# Patient Record
Sex: FEMALE | Race: WHITE
[De-identification: names, ages, dates, MRNs, and addresses within clinical notes are randomized per-mention and may not be internally consistent; named-entity substitution may affect disease eponyms.]

---

## 2021-01-07 ENCOUNTER — HOSPITAL ENCOUNTER (EMERGENCY)
Dept: HOSPITAL 47 - EC | Age: 61
Discharge: HOME | End: 2021-01-07
Payer: COMMERCIAL

## 2021-01-07 VITALS — HEART RATE: 74 BPM | DIASTOLIC BLOOD PRESSURE: 55 MMHG | SYSTOLIC BLOOD PRESSURE: 103 MMHG

## 2021-01-07 VITALS — TEMPERATURE: 97.9 F | RESPIRATION RATE: 18 BRPM

## 2021-01-07 DIAGNOSIS — F41.9: ICD-10-CM

## 2021-01-07 DIAGNOSIS — F32.9: ICD-10-CM

## 2021-01-07 DIAGNOSIS — Z88.1: ICD-10-CM

## 2021-01-07 DIAGNOSIS — Z99.89: ICD-10-CM

## 2021-01-07 DIAGNOSIS — Z88.5: ICD-10-CM

## 2021-01-07 DIAGNOSIS — E11.9: ICD-10-CM

## 2021-01-07 DIAGNOSIS — G47.30: ICD-10-CM

## 2021-01-07 DIAGNOSIS — Z79.899: ICD-10-CM

## 2021-01-07 DIAGNOSIS — E78.5: ICD-10-CM

## 2021-01-07 DIAGNOSIS — R42: ICD-10-CM

## 2021-01-07 DIAGNOSIS — I10: ICD-10-CM

## 2021-01-07 DIAGNOSIS — Z79.890: ICD-10-CM

## 2021-01-07 DIAGNOSIS — J45.909: ICD-10-CM

## 2021-01-07 DIAGNOSIS — R55: Primary | ICD-10-CM

## 2021-01-07 DIAGNOSIS — Z79.84: ICD-10-CM

## 2021-01-07 LAB
ALBUMIN SERPL-MCNC: 4.3 G/DL (ref 3.5–5)
ALP SERPL-CCNC: 118 U/L (ref 38–126)
ALT SERPL-CCNC: 19 U/L (ref 4–34)
ANION GAP SERPL CALC-SCNC: 7 MMOL/L
APTT BLD: 23.2 SEC (ref 22–30)
AST SERPL-CCNC: 22 U/L (ref 14–36)
BASOPHILS # BLD AUTO: 0.1 K/UL (ref 0–0.2)
BASOPHILS NFR BLD AUTO: 0 %
BUN SERPL-SCNC: 16 MG/DL (ref 7–17)
CALCIUM SPEC-MCNC: 9 MG/DL (ref 8.4–10.2)
CHLORIDE SERPL-SCNC: 106 MMOL/L (ref 98–107)
CO2 SERPL-SCNC: 26 MMOL/L (ref 22–30)
EOSINOPHIL # BLD AUTO: 0.4 K/UL (ref 0–0.7)
EOSINOPHIL NFR BLD AUTO: 4 %
ERYTHROCYTE [DISTWIDTH] IN BLOOD BY AUTOMATED COUNT: 4.92 M/UL (ref 3.8–5.4)
ERYTHROCYTE [DISTWIDTH] IN BLOOD: 15.5 % (ref 11.5–15.5)
GLUCOSE SERPL-MCNC: 154 MG/DL (ref 74–99)
HCT VFR BLD AUTO: 40.1 % (ref 34–46)
HGB BLD-MCNC: 12.7 GM/DL (ref 11.4–16)
INR PPP: 0.9 (ref ?–1.2)
LYMPHOCYTES # SPEC AUTO: 2.5 K/UL (ref 1–4.8)
LYMPHOCYTES NFR SPEC AUTO: 22 %
MAGNESIUM SPEC-SCNC: 2.3 MG/DL (ref 1.6–2.3)
MCH RBC QN AUTO: 25.9 PG (ref 25–35)
MCHC RBC AUTO-ENTMCNC: 31.7 G/DL (ref 31–37)
MCV RBC AUTO: 81.5 FL (ref 80–100)
MONOCYTES # BLD AUTO: 0.5 K/UL (ref 0–1)
MONOCYTES NFR BLD AUTO: 4 %
NEUTROPHILS # BLD AUTO: 7.6 K/UL (ref 1.3–7.7)
NEUTROPHILS NFR BLD AUTO: 67 %
PH UR: 6.5 [PH] (ref 5–8)
PLATELET # BLD AUTO: 266 K/UL (ref 150–450)
POTASSIUM SERPL-SCNC: 4.2 MMOL/L (ref 3.5–5.1)
PROT SERPL-MCNC: 7.2 G/DL (ref 6.3–8.2)
PT BLD: 9.6 SEC (ref 9–12)
SODIUM SERPL-SCNC: 139 MMOL/L (ref 137–145)
SP GR UR: 1.02 (ref 1–1.03)
UROBILINOGEN UR QL STRIP: <2 MG/DL (ref ?–2)
WBC # BLD AUTO: 11.3 K/UL (ref 3.8–10.6)

## 2021-01-07 PROCEDURE — 85730 THROMBOPLASTIN TIME PARTIAL: CPT

## 2021-01-07 PROCEDURE — 70496 CT ANGIOGRAPHY HEAD: CPT

## 2021-01-07 PROCEDURE — 93005 ELECTROCARDIOGRAM TRACING: CPT

## 2021-01-07 PROCEDURE — 99285 EMERGENCY DEPT VISIT HI MDM: CPT

## 2021-01-07 PROCEDURE — 83735 ASSAY OF MAGNESIUM: CPT

## 2021-01-07 PROCEDURE — 80053 COMPREHEN METABOLIC PANEL: CPT

## 2021-01-07 PROCEDURE — 81003 URINALYSIS AUTO W/O SCOPE: CPT

## 2021-01-07 PROCEDURE — 70498 CT ANGIOGRAPHY NECK: CPT

## 2021-01-07 PROCEDURE — 84484 ASSAY OF TROPONIN QUANT: CPT

## 2021-01-07 PROCEDURE — 36415 COLL VENOUS BLD VENIPUNCTURE: CPT

## 2021-01-07 PROCEDURE — 85610 PROTHROMBIN TIME: CPT

## 2021-01-07 PROCEDURE — 85025 COMPLETE CBC W/AUTO DIFF WBC: CPT

## 2021-01-07 PROCEDURE — 70450 CT HEAD/BRAIN W/O DYE: CPT

## 2021-01-07 PROCEDURE — 83605 ASSAY OF LACTIC ACID: CPT

## 2021-01-07 PROCEDURE — 71046 X-RAY EXAM CHEST 2 VIEWS: CPT

## 2021-01-07 NOTE — CT
EXAMINATION TYPE: CT angio head neck

 

DATE OF EXAM: 1/7/2021

 

COMPARISON: CT brain same day

 

HISTORY: 60-year-old female headache and dizziness. Family history of aneurysms

 

TECHNIQUE: Contiguous axial scanning of the head and neck performed with IV Contrast, patient injecte
d with 65 mL of Isovue 370. Coronal/sagittal MIP reconstructions performed. 3-D reconstructions gener
ated on a dedicated independent workstation.

 

CT DLP: 1711.2 mGycm

Automated exposure control for dose reduction was used.

 

FINDINGS: 

NECK:

Bovine configuration to the aortic arch.

 

Both vertebral artery origins are patent. The vessels are codominant and patent throughout their cour
se.

 

The right brachiocephalic and right common carotid arteries are patent.

Minimal eccentric atherosclerotic change within the right carotid bulb.

The right internal carotid artery is patent.

 

The left common and left internal carotid arteries are patent with minimal eccentric atherosclerotic 
calcification in the left carotid bulb.

 

HEAD:

The V4 segments of the bilateral vertebral arteries become somewhat small in caliber. The basilar art
bhvaesh is also small in caliber. Persistent fetal origin of the posterior cerebral arteries with hypopla
stic P1 segments of the posterior cerebral arteries.

 

Posterior circulation is otherwise patent.

 

The internal carotid arteries are patent as are the remainder of the anterior circulation. The left A
CA beyond the anterior communicating artery level is slightly hypoplastic.

 

No aneurysmal change is identified.

 

 

IMPRESSION: 

 

1. NECK: WIDELY PATENT CAROTID AND VERTEBRAL ARTERIES OF THE NECK.

 

2. HEAD: DIMINUTIVE INTRACRANIAL VERTEBROBASILAR SYSTEM. CORRELATE FOR ANY CHRONIC SYMPTOMS OF VERTEB
ROBASILAR INSUFFICIENCY. THERE ARE PERSISTENT FETAL ORIGINS TO THE BILATERAL PCA's. NO LARGE VESSEL I
NTRACRANIAL ARTERIAL OCCLUSION, SIGNIFICANT STENOSIS, OR ANEURYSMAL CHANGE IS SEEN.

## 2021-01-07 NOTE — CT
EXAMINATION TYPE: CT brain wo con

 

DATE OF EXAM: 1/7/2021

 

COMPARISON: None

 

HISTORY: 60-year-old female Headache and dizziness. Family history of aneurysms

 

TECHNIQUE:  Examination was done in axial plane without intravenous contrast.  Coronal and sagittal r
econstructions performed.

 

CT DLP: 1711.2 mGycm

Automated exposure control for dose reduction was used.

 

FINDINGS:

There is no evidence of  acute intracranial hemorrhage, acute ischemic changes, mass, mass-effect, or
 extra-axial fluid collection.  There is no effacement of cerebral sulci or basal subarachnoid cister
ns.  There is no hydrocephalus.  There is no midline shift.  Gray-white matter distinction is preserv
ed. 

 

Hyperostosis frontalis interna is normal variation some patchy white matter hypodensities in the left
 subinsular region suggesting changes of chronic small vessel ischemic disease.

 

Large left paracentral disc osteophyte complex incidentally noted at C2-C3 may contribute to signific
ant narrowing of the left neuroforamen.

 

Paranasal sinuses and mastoid air cells well pneumatized. Leftward nasal septal deviation. Orbits and
 globes are intact.

 

IMPRESSION:

No acute intracranial abnormality seen. Mild patchy changes of chronic small vessel ischemic disease.


 

Incidental left paracentral disc osteophyte complex at C2-C3 may contribute to a significant left maxwell
roforaminal stenosis.

## 2021-01-07 NOTE — ED
General Adult HPI





- General


Chief complaint: Syncope


Stated complaint: Fainting/Headaches


Time Seen by Provider: 01/07/21 09:45


Source: patient, RN notes reviewed, old records reviewed


Mode of arrival: ambulatory


Limitations: no limitations





- History of Present Illness


Initial comments: 





This a 60-year-old female who presents to the emergency department stating that 

last Friday she passed out and ever since she's been having episodes where she 

feels lightheaded and thinks she's got a passout.  Patient states she has not 

passed out again.  Patient states she's had a headache for about 2 months and 

has not had it worked up.  Patient denies any nausea vomiting.  Patient denies 

diarrhea.  Patient denies any chest pain palpitations difficulty breathing 

shortness of breath.  Patient denies any recent fever chills or cough per 

patient states she does not believe she had any chance of having any exposure to

cold.  Patient denies any dysuria hematuria urinary frequency.  Patient states 

she's been eating and drinking normally.





- Related Data


                                Home Medications











 Medication  Instructions  Recorded  Confirmed


 


Albuterol Inhaler [Ventolin Hfa 2 puff INHALATION RT-Q4H PRN 01/07/21 01/07/21





Inhaler]   


 


Amitriptyline HCl 25 mg PO HS 01/07/21 01/07/21


 


Atorvastatin Calcium [Lipitor] 20 mg PO DAILY 01/07/21 01/07/21


 


Carboxymethylcellulose Sodium 1 drop BOTH EYES DAILY PRN 01/07/21 01/07/21





[Refresh Tears]   


 


Cetirizine HCl [Zyrtec] 10 mg PO DAILY 01/07/21 01/07/21


 


Dexlansoprazole [Dexilant] 60 mg PO DAILY 01/07/21 01/07/21


 


EPINEPHrine (Auto Inject) [Epipen] 0.3 mg INJ ONCE PRN 01/07/21 01/07/21


 


Estrogens, Conjugated Cream 1 applic VAGINAL AS DIRECTED 01/07/21 01/07/21





[Premarin Cream]   


 


Fluticasone Nasal Spray [Flonase 2 spray EA NOSTRIL BID PRN 01/07/21 01/07/21





Nasal Spray]   


 


Fluticasone/Vilanterol [Breo 1 puff INHALATION RT-BID 01/07/21 01/07/21





Ellipta 200-25 Mcg INH]   


 


Insulin Aspart [NovoLOG Flexpen] See Protocol SQ TID PRN 01/07/21 01/07/21


 


Ipratropium-Albuterol Nebulize 3 ml INHALATION RT-Q4H PRN 01/07/21 01/07/21





[Duoneb 0.5 mg-3 mg/3 ml Soln]   


 


Losartan/Hydrochlorothiazide 1 tab PO DAILY 01/07/21 01/07/21





[Losartan-Hctz 100-12.5 mg Tab]   


 


Metoclopramide HCl [Reglan] 10 mg PO QID 01/07/21 01/07/21


 


Montelukast Sodium [Singulair] 10 mg PO HS 01/07/21 01/07/21


 


Sertraline [Zoloft] 50 mg PO DAILY 01/07/21 01/07/21


 


Sodium Chloride [Saline Nasal 2 spray EA NOSTRIL DAILY PRN 01/07/21 01/07/21





Spray]   


 


Sulfamethox-Tmp 800-160Mg [Bactrim 1 tab PO Q12H 01/07/21 01/07/21





-160 mg]   


 


clonazePAM [KlonoPIN] 0.5 mg PO Q8H PRN 01/07/21 01/07/21


 


cycloSPORINE 0.05% OPHTH SOLN 1 drop BOTH EYES DAILY 01/07/21 01/07/21





[Restasis]   


 


metFORMIN HCL ER [Glucophage Xr] 500 mg PO BID 01/07/21 01/07/21











                                    Allergies











Allergy/AdvReac Type Severity Reaction Status Date / Time


 


cefaclor [From Ceclor] Allergy  Anaphylaxis Verified 01/07/21 09:44


 


clindamycin Allergy  Anaphylaxis Verified 01/07/21 09:44


 


doxycycline Allergy  Anaphylaxis Verified 01/07/21 09:44


 


hydrocodone Allergy  Anaphylaxis Verified 01/07/21 09:44


 


hydromorphone [From Dilaudid] Allergy  Anaphylaxis Verified 01/07/21 09:44














Review of Systems


ROS Statement: 


Those systems with pertinent positive or pertinent negative responses have been 

documented in the HPI.





ROS Other: All systems not noted in ROS Statement are negative.





Past Medical History


Past Medical History: Asthma, Diabetes Mellitus, Hyperlipidemia, Hypertension, 

Sleep Apnea/CPAP/BIPAP


History of Any Multi-Drug Resistant Organisms: None Reported


Past Surgical History: Breast Surgery, Hernia Repair, Tonsillectomy


Additional Past Surgical History / Comment(s): R lumpectomy, partial 

hysterectomy


Past Psychological History: Anxiety, Depression


Smoking Status: Never smoker


Past Alcohol Use History: None Reported


Past Drug Use History: None Reported





General Exam





- General Exam Comments


Initial Comments: 





GENERAL:


Patient is well-developed and well-nourished.  Patient is nontoxic and well-

hydrated and is in mild distress.





ENT:


Neck is soft and supple.  No significant lymphadenopathy is noted.  Oropharynx 

is clear.  Moist mucous membranes.  Neck has full range of motion without 

eliciting any pain.  





EYES:


The sclera were anicteric and conjunctiva were pink and moist.  Extraocular 

movements were intact and pupils were equal round and reactive to light.  

Eyelids were unremarkable.





PULMONARY:


Unlabored respirations.  Good breath sounds bilaterally.  No audible rales 

rhonchi or wheezing was noted.





CARDIOVASCULAR:


There is a regular rate and rhythm without any murmurs gallops or rubs.  





ABDOMEN:


Soft and nontender with normal bowel sounds.  No palpable organomegaly was 

noted.  There is no palpable pulsatile mass.





SKIN:


Skin is clear with no lesions or rashes and otherwise unremarkable.





NEUROLOGIC:


Patient is alert and oriented 3 cranial nerves II through XII are grossly 

intact motor and sensory are also intact.





MUSCULOSKELETAL:


Normal extremities with adequate strength and full range of motion.  No lower 

extremity swelling or edema.  No calf tenderness.





LYMPHATICS:


No significant lymphadenopathy is noted





PSYCHIATRIC:


Normal psychiatric evaluation.  





Limitations: no limitations





Course


                                   Vital Signs











  01/07/21 01/07/21 01/07/21





  09:44 10:40 11:44


 


Temperature 97.9 F  


 


Pulse Rate 75  77


 


Respiratory 18  18





Rate   


 


Blood Pressure 123/83  122/60


 


Blood Pressure  127/52 





[Right Arm   





Sitting]   


 


Blood Pressure  122/70 





[Right Arm   





Standing]   


 


Blood Pressure  111/49 





[Right Arm   





Supine]   


 


O2 Sat by Pulse 98  97





Oximetry   














  01/07/21





  13:00


 


Temperature 


 


Pulse Rate 74


 


Respiratory 18





Rate 


 


Blood Pressure 103/55


 


Blood Pressure 





[Right Arm 





Sitting] 


 


Blood Pressure 





[Right Arm 





Standing] 


 


Blood Pressure 





[Right Arm 





Supine] 


 


O2 Sat by Pulse 97





Oximetry 














Medical Decision Making





- Medical Decision Making





EKG shows normal sinus rhythm at 76 bpm LA interval is 148 QRS is 86 QT interval

398 QTC is 447.  Patient's EKG shows no ST segment elevation or depression.





CT of the brain was read by the radiologist and I saw no abnormalities that 

would explain the patient's symptoms.





CT of the head and neck angiogram showed no acute abnormalities.


Personal symptoms.





- Lab Data


Result diagrams: 


                                 01/07/21 10:18





                                 01/07/21 10:18


                                   Lab Results











  01/07/21 01/07/21 01/07/21 Range/Units





  10:18 10:18 10:18 


 


WBC  11.3 H    (3.8-10.6)  k/uL


 


RBC  4.92    (3.80-5.40)  m/uL


 


Hgb  12.7    (11.4-16.0)  gm/dL


 


Hct  40.1    (34.0-46.0)  %


 


MCV  81.5    (80.0-100.0)  fL


 


MCH  25.9    (25.0-35.0)  pg


 


MCHC  31.7    (31.0-37.0)  g/dL


 


RDW  15.5    (11.5-15.5)  %


 


Plt Count  266    (150-450)  k/uL


 


MPV  8.0    


 


Neutrophils %  67    %


 


Lymphocytes %  22    %


 


Monocytes %  4    %


 


Eosinophils %  4    %


 


Basophils %  0    %


 


Neutrophils #  7.6    (1.3-7.7)  k/uL


 


Lymphocytes #  2.5    (1.0-4.8)  k/uL


 


Monocytes #  0.5    (0-1.0)  k/uL


 


Eosinophils #  0.4    (0-0.7)  k/uL


 


Basophils #  0.1    (0-0.2)  k/uL


 


PT   9.6   (9.0-12.0)  sec


 


INR   0.9   (<1.2)  


 


APTT   23.2   (22.0-30.0)  sec


 


Sodium    139  (137-145)  mmol/L


 


Potassium    4.2  (3.5-5.1)  mmol/L


 


Chloride    106  ()  mmol/L


 


Carbon Dioxide    26  (22-30)  mmol/L


 


Anion Gap    7  mmol/L


 


BUN    16  (7-17)  mg/dL


 


Creatinine    1.04  (0.52-1.04)  mg/dL


 


Est GFR (CKD-EPI)AfAm    69  (>60 ml/min/1.73 sqM)  


 


Est GFR (CKD-EPI)NonAf    60  (>60 ml/min/1.73 sqM)  


 


Glucose    154 H  (74-99)  mg/dL


 


Plasma Lactic Acid Carlos     (0.7-2.0)  mmol/L


 


Calcium    9.0  (8.4-10.2)  mg/dL


 


Magnesium    2.3  (1.6-2.3)  mg/dL


 


Total Bilirubin    0.5  (0.2-1.3)  mg/dL


 


AST    22  (14-36)  U/L


 


ALT    19  (4-34)  U/L


 


Alkaline Phosphatase    118  ()  U/L


 


Troponin I     (0.000-0.034)  ng/mL


 


Total Protein    7.2  (6.3-8.2)  g/dL


 


Albumin    4.3  (3.5-5.0)  g/dL


 


Urine Color     


 


Urine Appearance     (Clear)  


 


Urine pH     (5.0-8.0)  


 


Ur Specific Gravity     (1.001-1.035)  


 


Urine Protein     (Negative)  


 


Urine Glucose (UA)     (Negative)  


 


Urine Ketones     (Negative)  


 


Urine Blood     (Negative)  


 


Urine Nitrite     (Negative)  


 


Urine Bilirubin     (Negative)  


 


Urine Urobilinogen     (<2.0)  mg/dL


 


Ur Leukocyte Esterase     (Negative)  














  01/07/21 01/07/21 01/07/21 Range/Units





  10:18 10:18 10:56 


 


WBC     (3.8-10.6)  k/uL


 


RBC     (3.80-5.40)  m/uL


 


Hgb     (11.4-16.0)  gm/dL


 


Hct     (34.0-46.0)  %


 


MCV     (80.0-100.0)  fL


 


MCH     (25.0-35.0)  pg


 


MCHC     (31.0-37.0)  g/dL


 


RDW     (11.5-15.5)  %


 


Plt Count     (150-450)  k/uL


 


MPV     


 


Neutrophils %     %


 


Lymphocytes %     %


 


Monocytes %     %


 


Eosinophils %     %


 


Basophils %     %


 


Neutrophils #     (1.3-7.7)  k/uL


 


Lymphocytes #     (1.0-4.8)  k/uL


 


Monocytes #     (0-1.0)  k/uL


 


Eosinophils #     (0-0.7)  k/uL


 


Basophils #     (0-0.2)  k/uL


 


PT     (9.0-12.0)  sec


 


INR     (<1.2)  


 


APTT     (22.0-30.0)  sec


 


Sodium     (137-145)  mmol/L


 


Potassium     (3.5-5.1)  mmol/L


 


Chloride     ()  mmol/L


 


Carbon Dioxide     (22-30)  mmol/L


 


Anion Gap     mmol/L


 


BUN     (7-17)  mg/dL


 


Creatinine     (0.52-1.04)  mg/dL


 


Est GFR (CKD-EPI)AfAm     (>60 ml/min/1.73 sqM)  


 


Est GFR (CKD-EPI)NonAf     (>60 ml/min/1.73 sqM)  


 


Glucose     (74-99)  mg/dL


 


Plasma Lactic Acid Carlos  1.7    (0.7-2.0)  mmol/L


 


Calcium     (8.4-10.2)  mg/dL


 


Magnesium     (1.6-2.3)  mg/dL


 


Total Bilirubin     (0.2-1.3)  mg/dL


 


AST     (14-36)  U/L


 


ALT     (4-34)  U/L


 


Alkaline Phosphatase     ()  U/L


 


Troponin I   <0.012   (0.000-0.034)  ng/mL


 


Total Protein     (6.3-8.2)  g/dL


 


Albumin     (3.5-5.0)  g/dL


 


Urine Color    Yellow  


 


Urine Appearance    Clear  (Clear)  


 


Urine pH    6.5  (5.0-8.0)  


 


Ur Specific Gravity    1.016  (1.001-1.035)  


 


Urine Protein    Negative  (Negative)  


 


Urine Glucose (UA)    Negative  (Negative)  


 


Urine Ketones    Negative  (Negative)  


 


Urine Blood    Negative  (Negative)  


 


Urine Nitrite    Negative  (Negative)  


 


Urine Bilirubin    Negative  (Negative)  


 


Urine Urobilinogen    <2.0  (<2.0)  mg/dL


 


Ur Leukocyte Esterase    Negative  (Negative)  














Disposition


Clinical Impression: 


 Episode of syncope, Lightheaded





Disposition: HOME SELF-CARE


Condition: Good


Instructions (If sedation given, give patient instructions):  Syncope (ED)


Is patient prescribed a controlled substance at d/c from ED?: No


Referrals: 


Abdullahi Barnes DO [Primary Care Provider] - 1-2 days


Time of Disposition: 13:08

## 2021-01-07 NOTE — XR
EXAMINATION TYPE: XR chest 2V

 

DATE OF EXAM: 1/7/2021

 

COMPARISON: None

 

HISTORY: 52-year-old female with weakness

 

TECHNIQUE:  PA and lateral views

 

FINDINGS:  

Heart normal size. Aorta and pulmonary vasculature within normal limits. Mild interstitial prominence
 has a chronic appearance. No carmen consolidation or pleural effusion.

 

 

IMPRESSION:  

Chronic-appearing changes, possible bronchitis or asthma. No focal infiltrate seen.

## 2021-11-12 ENCOUNTER — HOSPITAL ENCOUNTER (EMERGENCY)
Dept: HOSPITAL 47 - EC | Age: 61
Discharge: HOME | End: 2021-11-12
Payer: COMMERCIAL

## 2021-11-12 VITALS
HEART RATE: 88 BPM | DIASTOLIC BLOOD PRESSURE: 81 MMHG | RESPIRATION RATE: 18 BRPM | SYSTOLIC BLOOD PRESSURE: 133 MMHG | TEMPERATURE: 97.4 F

## 2021-11-12 DIAGNOSIS — I10: ICD-10-CM

## 2021-11-12 DIAGNOSIS — E78.5: ICD-10-CM

## 2021-11-12 DIAGNOSIS — T36.1X5A: ICD-10-CM

## 2021-11-12 DIAGNOSIS — R11.0: ICD-10-CM

## 2021-11-12 DIAGNOSIS — J45.909: ICD-10-CM

## 2021-11-12 DIAGNOSIS — F41.9: ICD-10-CM

## 2021-11-12 DIAGNOSIS — Z79.1: ICD-10-CM

## 2021-11-12 DIAGNOSIS — F32.9: ICD-10-CM

## 2021-11-12 DIAGNOSIS — Z79.4: ICD-10-CM

## 2021-11-12 DIAGNOSIS — R42: Primary | ICD-10-CM

## 2021-11-12 DIAGNOSIS — E11.9: ICD-10-CM

## 2021-11-12 DIAGNOSIS — Z79.51: ICD-10-CM

## 2021-11-12 DIAGNOSIS — Z79.899: ICD-10-CM

## 2021-11-12 DIAGNOSIS — R61: ICD-10-CM

## 2021-11-12 LAB
ANION GAP SERPL CALC-SCNC: 17 MMOL/L
BASOPHILS # BLD AUTO: 0 K/UL (ref 0–0.2)
BASOPHILS NFR BLD AUTO: 0 %
BUN SERPL-SCNC: 12 MG/DL (ref 7–17)
CALCIUM SPEC-MCNC: 9.6 MG/DL (ref 8.4–10.2)
CHLORIDE SERPL-SCNC: 107 MMOL/L (ref 98–107)
CO2 SERPL-SCNC: 18 MMOL/L (ref 22–30)
EOSINOPHIL # BLD AUTO: 0.1 K/UL (ref 0–0.7)
EOSINOPHIL NFR BLD AUTO: 1 %
ERYTHROCYTE [DISTWIDTH] IN BLOOD BY AUTOMATED COUNT: 5.13 M/UL (ref 3.8–5.4)
ERYTHROCYTE [DISTWIDTH] IN BLOOD: 15.9 % (ref 11.5–15.5)
GLUCOSE BLD-MCNC: 163 MG/DL (ref 75–99)
GLUCOSE SERPL-MCNC: 170 MG/DL (ref 74–99)
HCT VFR BLD AUTO: 42 % (ref 34–46)
HGB BLD-MCNC: 13.2 GM/DL (ref 11.4–16)
LYMPHOCYTES # SPEC AUTO: 2.1 K/UL (ref 1–4.8)
LYMPHOCYTES NFR SPEC AUTO: 12 %
MCH RBC QN AUTO: 25.7 PG (ref 25–35)
MCHC RBC AUTO-ENTMCNC: 31.3 G/DL (ref 31–37)
MCV RBC AUTO: 81.9 FL (ref 80–100)
MONOCYTES # BLD AUTO: 0.4 K/UL (ref 0–1)
MONOCYTES NFR BLD AUTO: 3 %
NEUTROPHILS # BLD AUTO: 14 K/UL (ref 1.3–7.7)
NEUTROPHILS NFR BLD AUTO: 84 %
PLATELET # BLD AUTO: 283 K/UL (ref 150–450)
POTASSIUM SERPL-SCNC: 3.7 MMOL/L (ref 3.5–5.1)
SODIUM SERPL-SCNC: 142 MMOL/L (ref 137–145)
WBC # BLD AUTO: 16.7 K/UL (ref 3.8–10.6)

## 2021-11-12 PROCEDURE — 85025 COMPLETE CBC W/AUTO DIFF WBC: CPT

## 2021-11-12 PROCEDURE — 99284 EMERGENCY DEPT VISIT MOD MDM: CPT

## 2021-11-12 PROCEDURE — 96361 HYDRATE IV INFUSION ADD-ON: CPT

## 2021-11-12 PROCEDURE — 80048 BASIC METABOLIC PNL TOTAL CA: CPT

## 2021-11-12 PROCEDURE — 36415 COLL VENOUS BLD VENIPUNCTURE: CPT

## 2021-11-12 PROCEDURE — 96374 THER/PROPH/DIAG INJ IV PUSH: CPT

## 2021-11-12 NOTE — ED
General Adult HPI





- General


Chief complaint: Allergic Reaction


Stated complaint: Poss allergic reaction


Source: patient, EMS, RN notes reviewed


Mode of arrival: EMS


Limitations: no limitations





- History of Present Illness


Initial comments: 





60-year-old female presents to the emergency department via EMS from home for 

evaluation.  Patient states she received a Rocephin injection IM this morning 

for treatment of a sinus infection, then left her primary care provider's office

and went to the pharmacy where she became flushed, nauseous, and felt 

lightheaded.  States she did not eat breakfast prior to her appointment.  

Patient then drove herself home and called EMS to transport her to the hospital.

 Patient received Zofran and Benadryl in route, but states she continues to feel

sick to her stomach.  Patient denies any shortness of breath, tightness in her 

chest, or difficulty breathing.





- Related Data


                                Home Medications











 Medication  Instructions  Recorded  Confirmed


 


Amitriptyline HCl 25 mg PO HS 01/07/21 01/07/21


 


Atorvastatin Calcium [Lipitor] 20 mg PO DAILY 01/07/21 01/07/21


 


Carboxymethylcellulose Sodium 1 drop BOTH EYES DAILY PRN 01/07/21 01/07/21





[Refresh Tears]   


 


Cetirizine HCl [Zyrtec] 10 mg PO DAILY 01/07/21 01/07/21


 


Dexlansoprazole [Dexilant] 60 mg PO DAILY 01/07/21 01/07/21


 


EPINEPHrine (Auto Inject) [Epipen] 0.3 mg INJ ONCE PRN 01/07/21 01/07/21


 


Fluticasone Nasal Spray [Flonase 2 spray EA NOSTRIL BID PRN 01/07/21 01/07/21





Nasal Spray]   


 


Losartan/Hydrochlorothiazide 1 tab PO DAILY 01/07/21 01/07/21





[Losartan-Hctz 100-12.5 mg Tab]   


 


Metoclopramide HCl [Reglan] 10 mg PO QID 01/07/21 01/07/21


 


Montelukast Sodium [Singulair] 10 mg PO HS 01/07/21 01/07/21


 


Sertraline [Zoloft] 50 mg PO DAILY 01/07/21 01/07/21


 


Sodium Chloride [Saline Nasal 2 spray EA NOSTRIL DAILY PRN 01/07/21 01/07/21





Spray]   


 


Sulfamethox-Tmp 800-160Mg [Bactrim 1 tab PO Q12H 01/07/21 01/07/21





-160 mg]   


 


clonazePAM [KlonoPIN] 0.5 mg PO Q8H PRN 01/07/21 01/07/21


 


cycloSPORINE 0.05% OPHTH SOLN 1 drop BOTH EYES DAILY 01/07/21 01/07/21





[Restasis]   


 


metFORMIN HCL ER [Glucophage XR] 500 mg PO BID 01/07/21 01/07/21


 


Azelastine HCl [Astepro] 1 spray NASAL DAILY 11/12/21 11/12/21


 


Fluticasone/Umeclidin/Vilanter 1 puff INHALATION RT-DAILY 11/12/21 11/12/21





[Trelegy Ellipta 100-62.5-25]   


 


INSULIN LISPRO (humaLOG) [humaLOG] See Protocol SQ AC-TID PRN 11/12/21 11/12/21


 


Ibuprofen [Motrin] 800 mg PO Q8H PRN 11/12/21 11/12/21


 


Ipratropium-Albuterol Nebulize 3 ml INHALATION RT-Q8H PRN 11/12/21 11/12/21





[Duoneb 0.5 mg-3 mg/3 ml Soln]   











                                    Allergies











Allergy/AdvReac Type Severity Reaction Status Date / Time


 


cefaclor [From Ceclor] Allergy  Anaphylaxis Verified 11/12/21 12:33


 


clindamycin Allergy  Anaphylaxis Verified 11/12/21 12:33


 


doxycycline Allergy  Anaphylaxis Verified 11/12/21 12:33


 


hydrocodone Allergy  Anaphylaxis Verified 11/12/21 12:33


 


hydromorphone [From Dilaudid] Allergy  Anaphylaxis Verified 11/12/21 12:33


 


zinc Allergy  Rash/Hives Verified 11/12/21 12:33














Review of Systems


ROS Statement: 


Those systems with pertinent positive or pertinent negative responses have been 

documented in the HPI.





ROS Other: All systems not noted in ROS Statement are negative.





Past Medical History


Past Medical History: Asthma, Diabetes Mellitus, Hyperlipidemia, Hypertension, 

Sleep Apnea/CPAP/BIPAP


History of Any Multi-Drug Resistant Organisms: None Reported


Past Surgical History: Breast Surgery, Hernia Repair, Tonsillectomy


Additional Past Surgical History / Comment(s): R lumpectomy, partial 

hysterectomy


Past Psychological History: Anxiety, Depression


Smoking Status: Never smoker


Past Alcohol Use History: None Reported


Past Drug Use History: None Reported





General Exam


Limitations: no limitations


General appearance: alert, anxious, other (This is a well-developed, well-

nourished female who appears anxious and shaky.  Initial temperature 98.4, pulse

82, respirations 22, blood pressure 121/74, pulse ox 99% on room air.)


Eye exam: Present: normal appearance, PERRL, EOMI


ENT exam: Present: normal exam, normal oropharynx, mucous membranes moist


Neck exam: Present: normal inspection.  Absent: tenderness


Respiratory exam: Present: normal lung sounds bilaterally.  Absent: respiratory 

distress, wheezes, rales, rhonchi, stridor


Cardiovascular Exam: Present: regular rate, normal rhythm, normal heart sounds


GI/Abdominal exam: Present: soft, normal bowel sounds.  Absent: distended, 

tenderness, guarding, rebound, rigid


Neurological exam: Present: alert, oriented X3


  ** Expanded


Patient oriented to: Present: person, place, time


Speech: Present: fluid speech


Cranial nerves: EOM's Intact: Normal, Tongue Deviation: Normal


Motor strength exam: RUE: 5, LUE: 5, RLE: 5, LLE: 5


Eye Response: (4) open spontaneously


Motor Response: (6) obeys commands


Verbal Response: (5) oriented


Psychiatric exam: Present: anxious


Skin exam: Present: warm, dry, intact, other (Patient's chest and face appear 

flushed; no rash or hives visible.)





Course


                                   Vital Signs











  11/12/21 11/12/21 11/12/21





  10:51 11:15 13:40


 


Temperature 98.4 F  97.4 F L


 


Pulse Rate 82  88


 


Respiratory 22 20 18





Rate   


 


Blood Pressure 121/74  133/81


 


O2 Sat by Pulse 99  99





Oximetry   














- Reevaluation(s)


Reevaluation #1: 





11/12/21 12:40


Patient tolerating oral intake and is no longer shaky, flushed, or diaphoretic.


11/12/21 13:00


Patient ambulatory in the hallway without dizziness.





Medical Decision Making





- Medical Decision Making





60-year-old female with a history of asthma, hypertension, and diabetes presents

to the emergency department for evaluation of possible medication reaction.  

Upon arrival, patient appears shaky, flushed, and mildly diaphoretic; no chest 

pain, difficulty breathing, or shortness of breath.  Lungs CTA, no wheezing.  

Patient received Benadryl and Zofran per EMS with minimal improvement in 

symptoms.  IV fluids administered and repeat dose of Zofran given with 

improvement.  Patient tolerating oral intake without difficulty.  Laboratory 

specimens were reviewed; leukocytosis present.  Patient did receive antibiotic 

injection prior to arrival for treatment of the sinus infection and was 

prescribed oral antibiotic as well.  Suspect symptoms are attributed to a 

vasovagal reaction versus a medication reaction though did advise patient to use

caution and advised her primary care provider of today's events.  Results 

reviewed with patient.  She will be discharged home with instructions to follow-

up with her primary care provider for recheck.  Return parameters were discuss

ed.  Patient verbalizes understanding and agrees with this plan.  This patient's

case was discussed with my attending Dr. Avalos.





- Lab Data


Result diagrams: 


                                 11/12/21 11:31





                                 11/12/21 11:31


                                   Lab Results











  11/12/21 11/12/21 11/12/21 Range/Units





  11:14 11:31 11:31 


 


WBC   16.7 H   (3.8-10.6)  k/uL


 


RBC   5.13   (3.80-5.40)  m/uL


 


Hgb   13.2   (11.4-16.0)  gm/dL


 


Hct   42.0   (34.0-46.0)  %


 


MCV   81.9   (80.0-100.0)  fL


 


MCH   25.7   (25.0-35.0)  pg


 


MCHC   31.3   (31.0-37.0)  g/dL


 


RDW   15.9 H   (11.5-15.5)  %


 


Plt Count   283   (150-450)  k/uL


 


MPV   8.6   


 


Neutrophils %   84   %


 


Lymphocytes %   12   %


 


Monocytes %   3   %


 


Eosinophils %   1   %


 


Basophils %   0   %


 


Neutrophils #   14.0 H   (1.3-7.7)  k/uL


 


Lymphocytes #   2.1   (1.0-4.8)  k/uL


 


Monocytes #   0.4   (0-1.0)  k/uL


 


Eosinophils #   0.1   (0-0.7)  k/uL


 


Basophils #   0.0   (0-0.2)  k/uL


 


Sodium    142  (137-145)  mmol/L


 


Potassium    3.7  (3.5-5.1)  mmol/L


 


Chloride    107  ()  mmol/L


 


Carbon Dioxide    18 L  (22-30)  mmol/L


 


Anion Gap    17  mmol/L


 


BUN    12  (7-17)  mg/dL


 


Creatinine    0.86  (0.52-1.04)  mg/dL


 


Est GFR (CKD-EPI)AfAm    87  (>60 ml/min/1.73 sqM)  


 


Est GFR (CKD-EPI)NonAf    75  (>60 ml/min/1.73 sqM)  


 


Glucose    170 H  (74-99)  mg/dL


 


POC Glucose (mg/dL)  163 H    (75-99)  mg/dL


 


POC Glu Operater ID  Nilsa Medel    


 


Calcium    9.6  (8.4-10.2)  mg/dL














Disposition


Clinical Impression: 


 Vasovagal reaction





Disposition: HOME SELF-CARE


Condition: Stable


Instructions (If sedation given, give patient instructions):  Near Syncope (ED)


Additional Instructions: 


Rest.  Increase fluids.


Follow up with your Primary care provider for recheck in the next few days.


Discussed today's events with your PCP at your follow-up appointment.


Return to the emergency department with any shortness of breath, tightness in 

her chest, difficulty breathing, or any new or worsening problems.





Is patient prescribed a controlled substance at d/c from ED?: No


Referrals: 


Nonstaff,Physician [Primary Care Provider] - 1-2 days


Time of Disposition: 13:16